# Patient Record
Sex: FEMALE
[De-identification: names, ages, dates, MRNs, and addresses within clinical notes are randomized per-mention and may not be internally consistent; named-entity substitution may affect disease eponyms.]

---

## 2020-09-14 ENCOUNTER — FORM ENCOUNTER (OUTPATIENT)
Age: 40
End: 2020-09-14

## 2021-03-15 ENCOUNTER — FORM ENCOUNTER (OUTPATIENT)
Age: 41
End: 2021-03-15

## 2022-11-21 ENCOUNTER — FORM ENCOUNTER (OUTPATIENT)
Age: 42
End: 2022-11-21

## 2022-12-19 ENCOUNTER — FORM ENCOUNTER (OUTPATIENT)
Age: 42
End: 2022-12-19

## 2023-01-24 PROBLEM — Z00.00 ENCOUNTER FOR PREVENTIVE HEALTH EXAMINATION: Status: ACTIVE | Noted: 2023-01-24

## 2023-02-01 ENCOUNTER — NON-APPOINTMENT (OUTPATIENT)
Age: 43
End: 2023-02-01

## 2023-03-13 PROBLEM — R92.8 ABNORMAL FINDING ON BREAST IMAGING: Status: ACTIVE | Noted: 2023-03-13

## 2023-03-13 PROBLEM — N60.19 MULTIPLE CYSTS OF BREAST: Status: ACTIVE | Noted: 2023-03-13

## 2023-03-15 ENCOUNTER — APPOINTMENT (OUTPATIENT)
Dept: BREAST CENTER | Facility: CLINIC | Age: 43
End: 2023-03-15

## 2023-03-15 DIAGNOSIS — N60.19 DIFFUSE CYSTIC MASTOPATHY OF UNSPECIFIED BREAST: ICD-10-CM

## 2023-03-15 DIAGNOSIS — R92.8 OTHER ABNORMAL AND INCONCLUSIVE FINDINGS ON DIAGNOSTIC IMAGING OF BREAST: ICD-10-CM

## 2023-05-22 NOTE — HISTORY OF PRESENT ILLNESS
[FreeTextEntry1] : 42 year old female was referred by Dr. Jha who presents for initial evaluation regarding probably benign R breast area of architectural distortion, first seen on screening mammogram, less apparent on subsequent diagnostic imaging 12/20/22, with recommendation for 6 month follow-up right breast mammography. Also noted were numerous cysts. \par \par Denies prior breast surgeries. Patient reports history of 2 prior right breast biopsies, benign??? \par Patient denies palpable masses, nipple discharge, skin changes.??\par Patient denies family history of breast cancer?? Denies famhx of ovarian cancer.??\par \par Mildred Lifetime Risk %??\par \par

## 2023-05-22 NOTE — DATA REVIEWED
[FreeTextEntry1] : 11/22/22 (R) B/L sMMG/US: heterogeneously dense. 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 \par -There are multiple additional cysts, cluster of cysts, and complicated cysts. \par -There is questionable subtle architectural distortion in the right breast at 12:00 approximately 2-3cmfn, RECOMMEND right breast diagnostic mammography and right breast targeted ultrasound.  BI-RADS 0:  Incomplete.\par \par 12/20/22 (R) R DX MMG/Targeted R US: heterogeneously dense. The focus of distortion appears less apparent on spot compression views. Numerous cysts are noted in the right breast however no definitive sonographic abnormalities identified corresponding to the questionable area of distortion identified mammographically. Findings have probably benign characteristics. FOLLOW-UP: Six-month follow-up right breast mammography recommended to ensure stability.  BI-RADS 3:  Probably benign.

## 2023-05-22 NOTE — ASSESSMENT
[FreeTextEntry1] : 42 year old female presents for initial evaluation regarding probably benign R breast area of architectural distortion, first seen on screening mammogram, less apparent on subsequent diagnostic imaging 12/20/22.Also noted were numerous cysts. \par \par Mildred __ %. Patient without complaint. Physical exam WNL. Plan for R diagnostic mammogram and re-examination in June 2023. Patient verbalizes understanding and is in agreement with the plan.??? \par \par \par

## 2023-05-24 ENCOUNTER — APPOINTMENT (OUTPATIENT)
Dept: BREAST CENTER | Facility: CLINIC | Age: 43
End: 2023-05-24

## 2023-05-24 ENCOUNTER — NON-APPOINTMENT (OUTPATIENT)
Age: 43
End: 2023-05-24

## 2025-07-25 ENCOUNTER — NON-APPOINTMENT (OUTPATIENT)
Age: 45
End: 2025-07-25

## 2025-07-29 ENCOUNTER — NON-APPOINTMENT (OUTPATIENT)
Age: 45
End: 2025-07-29

## 2025-07-29 ENCOUNTER — APPOINTMENT (OUTPATIENT)
Dept: BREAST CENTER | Facility: CLINIC | Age: 45
End: 2025-07-29
Payer: MEDICAID

## 2025-07-29 VITALS
BODY MASS INDEX: 25.76 KG/M2 | HEIGHT: 61.65 IN | HEART RATE: 86 BPM | WEIGHT: 140 LBS | DIASTOLIC BLOOD PRESSURE: 68 MMHG | SYSTOLIC BLOOD PRESSURE: 117 MMHG

## 2025-07-29 DIAGNOSIS — Z78.9 OTHER SPECIFIED HEALTH STATUS: ICD-10-CM

## 2025-07-29 DIAGNOSIS — R92.8 OTHER ABNORMAL AND INCONCLUSIVE FINDINGS ON DIAGNOSTIC IMAGING OF BREAST: ICD-10-CM

## 2025-07-29 DIAGNOSIS — N60.19 DIFFUSE CYSTIC MASTOPATHY OF UNSPECIFIED BREAST: ICD-10-CM

## 2025-07-29 PROCEDURE — 76642 ULTRASOUND BREAST LIMITED: CPT | Mod: LT

## 2025-07-29 PROCEDURE — 99204 OFFICE O/P NEW MOD 45 MIN: CPT | Mod: 25

## 2025-07-29 RX ORDER — UBIDECARENONE/VIT E ACET 100MG-5
CAPSULE ORAL
Refills: 0 | Status: ACTIVE | COMMUNITY

## 2025-07-29 RX ORDER — CALCIUM CARBONATE/VITAMIN D3 600 MG-10
TABLET ORAL
Refills: 0 | Status: ACTIVE | COMMUNITY

## 2025-07-29 RX ORDER — CETIRIZINE HYDROCHLORIDE 10 MG/1
TABLET, FILM COATED ORAL
Refills: 0 | Status: ACTIVE | COMMUNITY

## 2025-07-29 RX ORDER — CYANOCOBALAMIN (VITAMIN B-12) 1000 MCG
TABLET ORAL
Refills: 0 | Status: ACTIVE | COMMUNITY

## 2025-09-11 ENCOUNTER — NON-APPOINTMENT (OUTPATIENT)
Age: 45
End: 2025-09-11